# Patient Record
Sex: FEMALE
[De-identification: names, ages, dates, MRNs, and addresses within clinical notes are randomized per-mention and may not be internally consistent; named-entity substitution may affect disease eponyms.]

---

## 2023-07-21 PROCEDURE — 93010 ELECTROCARDIOGRAM REPORT: CPT | Performed by: INTERNAL MEDICINE

## 2023-10-04 PROCEDURE — 93010 ELECTROCARDIOGRAM REPORT: CPT | Performed by: INTERNAL MEDICINE

## 2023-12-09 PROCEDURE — 93010 ELECTROCARDIOGRAM REPORT: CPT | Performed by: INTERNAL MEDICINE

## 2023-12-11 ENCOUNTER — EXTERNAL RECORD (OUTPATIENT)
Dept: HEALTH INFORMATION MANAGEMENT | Facility: OTHER | Age: 78
End: 2023-12-11

## 2023-12-11 PROCEDURE — 93306 TTE W/DOPPLER COMPLETE: CPT | Performed by: INTERNAL MEDICINE

## 2024-04-21 ENCOUNTER — HOSPITAL ENCOUNTER (OUTPATIENT)
Facility: HOSPITAL | Age: 79
Setting detail: OBSERVATION
Discharge: NURSING, REHAB OR LONG-TERM CARE | End: 2024-04-23
Attending: STUDENT IN AN ORGANIZED HEALTH CARE EDUCATION/TRAINING PROGRAM | Admitting: INTERNAL MEDICINE
Payer: MEDICARE

## 2024-04-21 ENCOUNTER — APPOINTMENT (OUTPATIENT)
Dept: GENERAL RADIOLOGY | Facility: HOSPITAL | Age: 79
End: 2024-04-21
Attending: STUDENT IN AN ORGANIZED HEALTH CARE EDUCATION/TRAINING PROGRAM
Payer: MEDICARE

## 2024-04-21 ENCOUNTER — APPOINTMENT (OUTPATIENT)
Dept: ULTRASOUND IMAGING | Facility: HOSPITAL | Age: 79
End: 2024-04-21
Attending: STUDENT IN AN ORGANIZED HEALTH CARE EDUCATION/TRAINING PROGRAM
Payer: MEDICARE

## 2024-04-21 DIAGNOSIS — R55 SYNCOPE AND COLLAPSE: ICD-10-CM

## 2024-04-21 DIAGNOSIS — N17.9 AKI (ACUTE KIDNEY INJURY) (HCC): Primary | ICD-10-CM

## 2024-04-21 DIAGNOSIS — R34 OLIGURIA: ICD-10-CM

## 2024-04-21 PROBLEM — N18.9 ANEMIA DUE TO CHRONIC KIDNEY DISEASE: Status: ACTIVE | Noted: 2024-04-21

## 2024-04-21 PROBLEM — N18.9 ACUTE KIDNEY INJURY SUPERIMPOSED ON CKD (HCC): Status: ACTIVE | Noted: 2024-04-21

## 2024-04-21 PROBLEM — D63.1 ANEMIA DUE TO CHRONIC KIDNEY DISEASE: Status: ACTIVE | Noted: 2024-04-21

## 2024-04-21 PROBLEM — R60.9 EDEMA: Status: ACTIVE | Noted: 2024-04-21

## 2024-04-21 LAB
ALBUMIN SERPL-MCNC: 3.1 G/DL (ref 3.4–5)
ALBUMIN/GLOB SERPL: 0.9 {RATIO} (ref 1–2)
ALP LIVER SERPL-CCNC: 113 U/L
ALT SERPL-CCNC: 12 U/L
ANION GAP SERPL CALC-SCNC: 5 MMOL/L (ref 0–18)
APTT PPP: 29 SECONDS (ref 23.3–35.6)
AST SERPL-CCNC: 6 U/L (ref 15–37)
BASOPHILS # BLD AUTO: 0.01 X10(3) UL (ref 0–0.2)
BASOPHILS NFR BLD AUTO: 0.3 %
BILIRUB SERPL-MCNC: 0.2 MG/DL (ref 0.1–2)
BUN BLD-MCNC: 84 MG/DL (ref 9–23)
CALCIUM BLD-MCNC: 7.9 MG/DL (ref 8.5–10.1)
CHLORIDE SERPL-SCNC: 112 MMOL/L (ref 98–112)
CO2 SERPL-SCNC: 24 MMOL/L (ref 21–32)
CREAT BLD-MCNC: 4.25 MG/DL
EGFRCR SERPLBLD CKD-EPI 2021: 10 ML/MIN/1.73M2 (ref 60–?)
EOSINOPHIL # BLD AUTO: 0.12 X10(3) UL (ref 0–0.7)
EOSINOPHIL NFR BLD AUTO: 3 %
ERYTHROCYTE [DISTWIDTH] IN BLOOD BY AUTOMATED COUNT: 17.6 %
GLOBULIN PLAS-MCNC: 3.3 G/DL (ref 2.8–4.4)
GLUCOSE BLD-MCNC: 188 MG/DL (ref 70–99)
HCT VFR BLD AUTO: 26.1 %
HGB BLD-MCNC: 7.7 G/DL
IMM GRANULOCYTES # BLD AUTO: 0.02 X10(3) UL (ref 0–1)
IMM GRANULOCYTES NFR BLD: 0.5 %
INR BLD: 1.12 (ref 0.8–1.2)
LYMPHOCYTES # BLD AUTO: 0.5 X10(3) UL (ref 1–4)
LYMPHOCYTES NFR BLD AUTO: 12.5 %
MCH RBC QN AUTO: 24.1 PG (ref 26–34)
MCHC RBC AUTO-ENTMCNC: 29.5 G/DL (ref 31–37)
MCV RBC AUTO: 81.6 FL
MONOCYTES # BLD AUTO: 0.61 X10(3) UL (ref 0.1–1)
MONOCYTES NFR BLD AUTO: 15.3 %
NEUTROPHILS # BLD AUTO: 2.74 X10 (3) UL (ref 1.5–7.7)
NEUTROPHILS # BLD AUTO: 2.74 X10(3) UL (ref 1.5–7.7)
NEUTROPHILS NFR BLD AUTO: 68.4 %
OSMOLALITY SERPL CALC.SUM OF ELEC: 322 MOSM/KG (ref 275–295)
PLATELET # BLD AUTO: 96 10(3)UL (ref 150–450)
POTASSIUM SERPL-SCNC: 5.1 MMOL/L (ref 3.5–5.1)
PROT SERPL-MCNC: 6.4 G/DL (ref 6.4–8.2)
PROTHROMBIN TIME: 14.5 SECONDS (ref 11.6–14.8)
RBC # BLD AUTO: 3.2 X10(6)UL
SODIUM SERPL-SCNC: 141 MMOL/L (ref 136–145)
TROPONIN I SERPL HS-MCNC: 8 NG/L
WBC # BLD AUTO: 4 X10(3) UL (ref 4–11)

## 2024-04-21 PROCEDURE — 93970 EXTREMITY STUDY: CPT | Performed by: STUDENT IN AN ORGANIZED HEALTH CARE EDUCATION/TRAINING PROGRAM

## 2024-04-21 PROCEDURE — 71045 X-RAY EXAM CHEST 1 VIEW: CPT | Performed by: STUDENT IN AN ORGANIZED HEALTH CARE EDUCATION/TRAINING PROGRAM

## 2024-04-21 PROCEDURE — 99223 1ST HOSP IP/OBS HIGH 75: CPT | Performed by: INTERNAL MEDICINE

## 2024-04-21 RX ORDER — ACETAMINOPHEN 500 MG
1000 TABLET ORAL ONCE
Status: COMPLETED | OUTPATIENT
Start: 2024-04-21 | End: 2024-04-21

## 2024-04-21 RX ORDER — TRAZODONE HYDROCHLORIDE 100 MG/1
200 TABLET ORAL NIGHTLY
COMMUNITY

## 2024-04-21 RX ORDER — CALCITRIOL 0.25 UG/1
0.25 CAPSULE, LIQUID FILLED ORAL DAILY
COMMUNITY

## 2024-04-21 RX ORDER — ONDANSETRON 2 MG/ML
4 INJECTION INTRAMUSCULAR; INTRAVENOUS EVERY 6 HOURS PRN
Status: DISCONTINUED | OUTPATIENT
Start: 2024-04-21 | End: 2024-04-23

## 2024-04-21 RX ORDER — DULOXETIN HYDROCHLORIDE 60 MG/1
60 CAPSULE, DELAYED RELEASE ORAL DAILY
COMMUNITY

## 2024-04-21 RX ORDER — ALLOPURINOL 100 MG/1
100 TABLET ORAL DAILY
COMMUNITY

## 2024-04-21 RX ORDER — PANTOPRAZOLE SODIUM 40 MG/1
40 TABLET, DELAYED RELEASE ORAL DAILY
COMMUNITY

## 2024-04-21 RX ORDER — HEPARIN SODIUM 5000 [USP'U]/ML
5000 INJECTION, SOLUTION INTRAVENOUS; SUBCUTANEOUS EVERY 8 HOURS SCHEDULED
Status: DISCONTINUED | OUTPATIENT
Start: 2024-04-21 | End: 2024-04-23

## 2024-04-21 RX ORDER — FLUTICASONE PROPIONATE 50 MCG
2 SPRAY, SUSPENSION (ML) NASAL DAILY
COMMUNITY

## 2024-04-21 RX ORDER — CLONIDINE HYDROCHLORIDE 0.2 MG/1
0.2 TABLET ORAL 2 TIMES DAILY
COMMUNITY

## 2024-04-21 RX ORDER — ACETAMINOPHEN 500 MG
500 TABLET ORAL EVERY 4 HOURS PRN
Status: DISCONTINUED | OUTPATIENT
Start: 2024-04-21 | End: 2024-04-23

## 2024-04-21 RX ORDER — GABAPENTIN 100 MG/1
200 CAPSULE ORAL 3 TIMES DAILY
COMMUNITY

## 2024-04-21 RX ORDER — FUROSEMIDE 40 MG/1
40 TABLET ORAL DAILY
COMMUNITY

## 2024-04-21 RX ORDER — LIDOCAINE 50 MG/G
1 PATCH TOPICAL EVERY 12 HOURS
COMMUNITY

## 2024-04-21 RX ORDER — HYDRALAZINE HYDROCHLORIDE 50 MG/1
50 TABLET, FILM COATED ORAL
COMMUNITY

## 2024-04-21 RX ORDER — IPRATROPIUM BROMIDE AND ALBUTEROL SULFATE 2.5; .5 MG/3ML; MG/3ML
3 SOLUTION RESPIRATORY (INHALATION)
COMMUNITY

## 2024-04-21 RX ORDER — MEMANTINE HYDROCHLORIDE 10 MG/1
10 TABLET ORAL DAILY
COMMUNITY

## 2024-04-21 RX ORDER — MAGNESIUM OXIDE 400 MG (241.3 MG MAGNESIUM) TABLET
1 TABLET 2 TIMES DAILY
COMMUNITY

## 2024-04-21 RX ORDER — ATORVASTATIN CALCIUM 20 MG/1
20 TABLET, FILM COATED ORAL DAILY
COMMUNITY

## 2024-04-21 RX ORDER — METOCLOPRAMIDE HYDROCHLORIDE 5 MG/ML
5 INJECTION INTRAMUSCULAR; INTRAVENOUS EVERY 8 HOURS PRN
Status: DISCONTINUED | OUTPATIENT
Start: 2024-04-21 | End: 2024-04-23

## 2024-04-21 RX ORDER — CARVEDILOL 12.5 MG/1
12.5 TABLET ORAL 2 TIMES DAILY WITH MEALS
COMMUNITY

## 2024-04-21 RX ORDER — LEVOTHYROXINE SODIUM 0.1 MG/1
100 TABLET ORAL
COMMUNITY

## 2024-04-21 RX ORDER — CARBAMAZEPINE 200 MG/1
200 TABLET ORAL 3 TIMES DAILY
COMMUNITY

## 2024-04-21 NOTE — ED QUICK NOTES
PT family member arrives at bedside and stated that the PT had taken a THC gummy prior to going out to dinner.  The patient confirms that she did take the gummy about an hour ago. MD demarco

## 2024-04-21 NOTE — H&P
[facility]     Daisy Gorman Patient Status:  Emergency    1945 MRN [id number]   Location [facility] EMERGENCY DEPARTMENT Attending Nelly Jeronimo MD   Hosp Day # 0 PCP None Pcp     Chief Complaint: syncope    Subjective:    History of Present Illness:     Daisy Gorman is a 79 year old female with episode of syncope while at dinner. Pt was celebrating her birthday today with family when she had episode. Family thought she had just fallen asleep but when they tried to arouse her she was unresponsive. They laid her down and called EMS. She is awake and alert now in ED. She denies any focal weakness, numbness or tingling. No CP or SOB. She has noticed some worsening leg swelling. She denies any N/V/D/C or abd pain. She states she has been eatign well. Pt has noticed that she has been urinating less and less.     History/Other:    Past Medical History:  No past medical history on file.  Past Surgical History:   No past surgical history on file.   Family History:   No family history on file.  Social History:         Allergies:   Allergies   Allergen Reactions    Amoxicillin-Pot Clavulanate OTHER (SEE COMMENTS)     .    Clavulanic Acid OTHER (SEE COMMENTS)    Diphtheria Toxoid OTHER (SEE COMMENTS)    Influenza Vaccines OTHER (SEE COMMENTS)     .    Pertussis Vaccines OTHER (SEE COMMENTS)    Tetanus Toxoid OTHER (SEE COMMENTS)     .    Penicillins RASH       Medications:    No current facility-administered medications on file prior to encounter.     No current outpatient medications on file prior to encounter.       Review of Systems:   A comprehensive review of systems was completed.    Pertinent positives and negatives noted in the HPI.    Objective:   Physical Exam:    /79   Pulse 59   Temp 97 °F (36.1 °C) (Temporal)   Resp 15   Wt 160 lb (72.6 kg)   SpO2 98%   General: No acute distress, Alert  Respiratory: decreasd BS  Cardiovascular: S1, S2. Regular rate and  rhythm  Abdomen: Soft, Non-tender, non-distended, positive bowel sounds  Neuro: No new focal deficits  Extremities: + edema    Results:    Labs:      Labs Last 24 Hours:    Recent Labs   Lab 04/21/24  1613   RBC 3.20*   HGB 7.7*   HCT 26.1*   MCV 81.6   MCH 24.1*   MCHC 29.5*   RDW 17.6   NEPRELIM 2.74   WBC 4.0   PLT 96.0*       Recent Labs   Lab 04/21/24  1613   *   BUN 84*   CREATSERUM 4.25*   EGFRCR 10*   CA 7.9*   ALB 3.1*      K 5.1      CO2 24.0   ALKPHO 113   AST 6*   ALT 12*   BILT 0.2   TP 6.4       Lab Results   Component Value Date    INR 1.12 04/21/2024       Recent Labs   Lab 04/21/24  1613   TROPHS 8       No results for input(s): \"TROP\", \"PBNP\" in the last 168 hours.    No results for input(s): \"PCT\" in the last 168 hours.    Imaging: Imaging data reviewed in Epic.    Assessment & Plan:      #Syncope  Monitor on tele  Check ECHO  Check orthostatics  Cardiology to eval  Check CT head    #Acute on CKD  Renal to eval  Check renal US  Monitor urine output    #Lower Ext Edema  #Pulm Edema  Check LE doppler  Cardiology to eval    #Anemia  No active bleeding  Presumed chronic from renal disease  Check iron studies          Plan of care discussed with patient, ED Physician    Ford Linton MD    Supplementary Documentation:     The 21st Century Cures Act makes medical notes like these available to patients in the interest of transparency. Please be advised this is a medical document. Medical documents are intended to carry relevant information, facts as evident, and the clinical opinion of the practitioner. The medical note is intended as peer to peer communication and may appear blunt or direct. It is written in medical language and may contain abbreviations or verbiage that are unfamiliar.

## 2024-04-21 NOTE — ED PROVIDER NOTES
Patient Seen in: [facility] Emergency Department      History     Chief Complaint   Patient presents with    Syncope     Syncopal episode while out to dinner. No fall or trauma. No seizure activity. PT seemed \"asleep\" and was snoring. Lasted about 2-3 mins     Stated Complaint: Syncopal episode while out to dinner.  No fall or trauma. No seizure activity. *    Subjective:   HPI    The patient is a 79-year-old female brought in by EMS after syncopal episode.  Patient was at dinner celebrating her birthday with family members when her niece who is present states she heard the patient snoring.  She looked at her and the patient had her eyes closed.  Initially she thought the patient had just fallen asleep which was still unusual.  She tried to get the patient's attention called her name but the patient was still unresponsive therefore they laid her down across 3 chairs.  By the time EMS arrived patient was becoming more alert.  Currently the patient has no complaints.  She does not know what happened and she is asking why she here in the hospital.  She tells me she has chronic pain in her right shoulder but has no other that I do see that she appears somewhat fatigued as her eyes are drooping as she is talking.    Objective:   No pertinent past medical history.            No pertinent past surgical history.              No pertinent social history.            Review of Systems    Positive for stated complaint: Syncopal episode while out to dinner.  No fall or trauma. No seizure activity. *  Other systems are as noted in HPI.  Constitutional and vital signs reviewed.      All other systems reviewed and negative except as noted above.    Physical Exam     ED Triage Vitals [04/21/24 1606]   /51   Pulse 58   Resp 18   Temp 97 °F (36.1 °C)   Temp src Temporal   SpO2 97 %   O2 Device None (Room air)       Current:/79 (BP Location: Right arm)   Pulse 69   Temp 97.4 °F (36.3 °C) (Oral)   Resp 16   Ht  154.9 cm (5' 1\")   Wt 72.6 kg   SpO2 98%   BMI 30.23 kg/m²         Physical Exam  Vitals and nursing note reviewed.   Constitutional:       General: She is not in acute distress.     Appearance: Normal appearance.   HENT:      Head: Normocephalic.      Nose: Nose normal.      Mouth/Throat:      Mouth: Mucous membranes are moist.   Eyes:      Extraocular Movements: Extraocular movements intact.      Pupils: Pupils are equal, round, and reactive to light.   Cardiovascular:      Rate and Rhythm: Normal rate and regular rhythm.      Pulses: Normal pulses.   Pulmonary:      Effort: Pulmonary effort is normal.   Abdominal:      General: Abdomen is flat. Bowel sounds are normal. There is no distension.      Palpations: Abdomen is soft.      Tenderness: There is no abdominal tenderness. There is no right CVA tenderness, left CVA tenderness, guarding or rebound.      Hernia: No hernia is present.   Musculoskeletal:         General: No swelling or tenderness. Normal range of motion.      Cervical back: Normal range of motion.   Skin:     General: Skin is warm and dry.   Neurological:      Mental Status: She is alert and oriented to person, place, and time. Mental status is at baseline.   Psychiatric:         Mood and Affect: Mood normal.               ED Course     Labs Reviewed   COMP METABOLIC PANEL (14) - Abnormal; Notable for the following components:       Result Value    Glucose 188 (*)     BUN 84 (*)     Creatinine 4.25 (*)     Calcium, Total 7.9 (*)     Calculated Osmolality 322 (*)     eGFR-Cr 10 (*)     AST 6 (*)     ALT 12 (*)     Albumin 3.1 (*)     A/G Ratio 0.9 (*)     All other components within normal limits   CBC W/ DIFFERENTIAL - Abnormal; Notable for the following components:    RBC 3.20 (*)     HGB 7.7 (*)     HCT 26.1 (*)     PLT 96.0 (*)     MCH 24.1 (*)     MCHC 29.5 (*)     Lymphocyte Absolute 0.50 (*)     All other components within normal limits   TROPONIN I HIGH SENSITIVITY - Normal   PROTHROMBIN  TIME (PT) - Normal   PTT, ACTIVATED - Normal   CBC WITH DIFFERENTIAL WITH PLATELET    Narrative:     The following orders were created for panel order CBC With Differential With Platelet.  Procedure                               Abnormality         Status                     ---------                               -----------         ------                     CBC W/ DIFFERENTIAL[[id number]]          Abnormal            Final result                 Please view results for these tests on the individual orders.   SCAN SLIDE   URINALYSIS WITH CULTURE REFLEX   DRUG ABUSE PANEL 10 SCREEN   BASIC METABOLIC PANEL (8)   CBC WITH DIFFERENTIAL WITH PLATELET   MAGNESIUM   IRON AND TIBC   RAINBOW DRAW BLUE   RAINBOW DRAW GOLD     EKG    Rate, intervals and axes as noted on EKG Report.  Rate: 57  Rhythm: Sinus Rhythm  Reading: No ST elevations depressions noted but the patient has a first-degree AV block and sinus bradycardia         US VENOUS DOPPLER LEG BILAT - DIAG IMG (CPT=93970)    Result Date: 4/21/2024  CONCLUSION:  No evidence of deep vein thrombus in the bilateral lower extremities.   LOCATION:  [facility]   Dictated by (CST): Omega Gonzales MD on 4/21/2024 at 8:08 PM     Finalized by (CST): Omega Gonzales MD on 4/21/2024 at 8:09 PM       XR CHEST AP PORTABLE  (CPT=71045)    Result Date: 4/21/2024  CONCLUSION:  Diffuse interstitial prominence and mild cardiomegaly.  Findings may be related to edema though superimposed infectious/inflammatory process is not excluded.   LOCATION:  [facility]      Dictated by (CST): Omega Gonzales MD on 4/21/2024 at 5:47 PM     Finalized by (CST): Omega Gonzales MD on 4/21/2024 at 5:47 PM                       MDM      The differential includes the following  Cardiac arrhythmia, acute MI, acute intracranial hemorrhage spontaneously which would all be life threats, possible PE, sepsis    Pertinent comorbidities include    Pertinent social history includes      Labs  Hemoglobin of 7.7, MCV of  81.6  Creatinine of 4.25 today, creatinine of 3.78 in January 2024 creatinine appears closer to 3.4 baseline      Imaging studies  I reviewed the images and my independent interpreation after review no signs of pneumonia. Additionally, I reviewd the radiology report that states the following diffuse interstitial prominence and cardiomegaly    External data reviewed  CBCs from last year showing hemoglobin baseline closer to 8.5-8.7, in March 2023 patient's hemoglobin was 7.5  In care everywhere there is a note from [facility] October 2022 for EGD and colonoscopy findings as follows Chronic gastritis gastric atrophy H. pylori negative.  Multiple tubular adenomas and tubular villous adenoma repeat colonoscopy 5 years         Discussion of management with external providers    ER course  Patient is afebrile and otherwise hemodynamically stable here.  Besides her having difficulty raising her right arm and having some bilateral mild edema she has no other focal findings on exam.  She is alert and oriented to person and place.  There is a wide variation of what could have caused her syncopal episode therefore a broad workup will be performed.    4:27 PM  I was informed by nursing staff that patient apparently took an edible    5:12 PM  Bill a BUN of 80, creatinine of 4.25 which is above patient's baseline has I reviewed care everywhere and it looks like her creatinine is usually between 3-3.4 patient's hemoglobin is also 7.7 and her baseline is closer to s 8.7.    6:34 PM  Patient is still not produced any urine.  She complains of bilateral lower extremity swelling and pain therefore ultrasound was performed which showed no evidence of DVT.  Given her lab derangements her syncopal episode and fell likely secondary to her THC gummy use I discussed admission for observation with the patient and her family members who ultimately agreed to this.  [facility]                             Medical  Decision Making      Disposition and Plan     Clinical Impression:  1. BARBER (acute kidney injury) ([facility])    2. Syncope and collapse    3. Oliguria         Disposition:  Admit  4/21/2024  6:28 pm    Follow-up:  No follow-up provider specified.        Medications Prescribed:  Current Discharge Medication List                            Hospital Problems       Present on Admission             ICD-10-CM Noted POA    * (Principal) BARBER (acute kidney injury) ([facility]) N17.9 4/21/2024 Unknown    Acute kidney injury superimposed on CKD ([facility]) N17.9, N18.9 4/21/2024 Unknown    Anemia due to chronic kidney disease N18.9, D63.1 4/21/2024 Unknown    Edema R60.9 4/21/2024 Unknown    Oliguria R34 4/21/2024 Unknown    Syncope R55 4/21/2024 Unknown    Syncope and collapse R55 4/21/2024 Unknown

## 2024-04-21 NOTE — ED INITIAL ASSESSMENT (HPI)
Syncopal episode while out to dinner. No fall or trauma. No seizure activity. PT seemed \"asleep\" and was snoring. Lasted about 2-3 mins

## 2024-04-22 ENCOUNTER — APPOINTMENT (OUTPATIENT)
Dept: CV DIAGNOSTICS | Facility: HOSPITAL | Age: 79
End: 2024-04-22
Attending: INTERNAL MEDICINE
Payer: MEDICARE

## 2024-04-22 ENCOUNTER — APPOINTMENT (OUTPATIENT)
Dept: ULTRASOUND IMAGING | Facility: HOSPITAL | Age: 79
End: 2024-04-22
Attending: INTERNAL MEDICINE
Payer: MEDICARE

## 2024-04-22 ENCOUNTER — NURSE ONLY (OUTPATIENT)
Dept: ELECTROPHYSIOLOGY | Facility: HOSPITAL | Age: 79
End: 2024-04-22
Attending: INTERNAL MEDICINE
Payer: MEDICARE

## 2024-04-22 PROBLEM — R56.9 SEIZURE (HCC): Status: ACTIVE | Noted: 2024-04-22

## 2024-04-22 LAB
ANION GAP SERPL CALC-SCNC: 7 MMOL/L (ref 0–18)
ATRIAL RATE: 57 BPM
BASOPHILS # BLD AUTO: 0.02 X10(3) UL (ref 0–0.2)
BASOPHILS NFR BLD AUTO: 0.6 %
BUN BLD-MCNC: 80 MG/DL (ref 9–23)
CALCIUM BLD-MCNC: 8.5 MG/DL (ref 8.5–10.1)
CHLORIDE SERPL-SCNC: 111 MMOL/L (ref 98–112)
CO2 SERPL-SCNC: 24 MMOL/L (ref 21–32)
CREAT BLD-MCNC: 3.48 MG/DL
EGFRCR SERPLBLD CKD-EPI 2021: 13 ML/MIN/1.73M2 (ref 60–?)
EOSINOPHIL # BLD AUTO: 0.12 X10(3) UL (ref 0–0.7)
EOSINOPHIL NFR BLD AUTO: 3.8 %
ERYTHROCYTE [DISTWIDTH] IN BLOOD BY AUTOMATED COUNT: 17.8 %
FOLATE SERPL-MCNC: 5.2 NG/ML (ref 8.7–?)
GLUCOSE BLD-MCNC: 91 MG/DL (ref 70–99)
HCT VFR BLD AUTO: 24.8 %
HGB BLD-MCNC: 7.4 G/DL
IMM GRANULOCYTES # BLD AUTO: 0.01 X10(3) UL (ref 0–1)
IMM GRANULOCYTES NFR BLD: 0.3 %
IRON SATN MFR SERPL: 31 %
IRON SERPL-MCNC: 56 UG/DL
LYMPHOCYTES # BLD AUTO: 0.49 X10(3) UL (ref 1–4)
LYMPHOCYTES NFR BLD AUTO: 15.6 %
MAGNESIUM SERPL-MCNC: 2.1 MG/DL (ref 1.6–2.6)
MCH RBC QN AUTO: 24.1 PG (ref 26–34)
MCHC RBC AUTO-ENTMCNC: 29.8 G/DL (ref 31–37)
MCV RBC AUTO: 80.8 FL
MONOCYTES # BLD AUTO: 0.45 X10(3) UL (ref 0.1–1)
MONOCYTES NFR BLD AUTO: 14.3 %
NEUTROPHILS # BLD AUTO: 2.05 X10 (3) UL (ref 1.5–7.7)
NEUTROPHILS # BLD AUTO: 2.05 X10(3) UL (ref 1.5–7.7)
NEUTROPHILS NFR BLD AUTO: 65.4 %
OSMOLALITY SERPL CALC.SUM OF ELEC: 318 MOSM/KG (ref 275–295)
P AXIS: 74 DEGREES
P-R INTERVAL: 276 MS
PLATELET # BLD AUTO: 115 10(3)UL (ref 150–450)
POTASSIUM SERPL-SCNC: 4.4 MMOL/L (ref 3.5–5.1)
Q-T INTERVAL: 462 MS
QRS DURATION: 82 MS
QTC CALCULATION (BEZET): 449 MS
R AXIS: 71 DEGREES
RBC # BLD AUTO: 3.07 X10(6)UL
SODIUM SERPL-SCNC: 142 MMOL/L (ref 136–145)
T AXIS: 35 DEGREES
TIBC SERPL-MCNC: 180 UG/DL (ref 240–450)
TRANSFERRIN SERPL-MCNC: 121 MG/DL (ref 200–360)
VENTRICULAR RATE: 57 BPM
WBC # BLD AUTO: 3.1 X10(3) UL (ref 4–11)

## 2024-04-22 PROCEDURE — 93306 TTE W/DOPPLER COMPLETE: CPT | Performed by: INTERNAL MEDICINE

## 2024-04-22 PROCEDURE — 99223 1ST HOSP IP/OBS HIGH 75: CPT | Performed by: INTERNAL MEDICINE

## 2024-04-22 PROCEDURE — 95816 EEG AWAKE AND DROWSY: CPT | Performed by: OTHER

## 2024-04-22 PROCEDURE — 76775 US EXAM ABDO BACK WALL LIM: CPT | Performed by: INTERNAL MEDICINE

## 2024-04-22 PROCEDURE — 99232 SBSQ HOSP IP/OBS MODERATE 35: CPT | Performed by: INTERNAL MEDICINE

## 2024-04-22 RX ORDER — HYDRALAZINE HYDROCHLORIDE 50 MG/1
50 TABLET, FILM COATED ORAL
Status: DISCONTINUED | OUTPATIENT
Start: 2024-04-22 | End: 2024-04-23

## 2024-04-22 RX ORDER — FLUTICASONE PROPIONATE 50 MCG
2 SPRAY, SUSPENSION (ML) NASAL DAILY
Status: DISCONTINUED | OUTPATIENT
Start: 2024-04-22 | End: 2024-04-23

## 2024-04-22 RX ORDER — LEVOTHYROXINE SODIUM 0.1 MG/1
100 TABLET ORAL
Status: DISCONTINUED | OUTPATIENT
Start: 2024-04-22 | End: 2024-04-23

## 2024-04-22 RX ORDER — CARBAMAZEPINE 200 MG/1
200 TABLET ORAL 3 TIMES DAILY
Status: DISCONTINUED | OUTPATIENT
Start: 2024-04-22 | End: 2024-04-23

## 2024-04-22 RX ORDER — CARVEDILOL 12.5 MG/1
12.5 TABLET ORAL 2 TIMES DAILY WITH MEALS
Status: DISCONTINUED | OUTPATIENT
Start: 2024-04-22 | End: 2024-04-23

## 2024-04-22 RX ORDER — GABAPENTIN 100 MG/1
100 CAPSULE ORAL 3 TIMES DAILY
Status: DISCONTINUED | OUTPATIENT
Start: 2024-04-22 | End: 2024-04-23

## 2024-04-22 RX ORDER — DULOXETIN HYDROCHLORIDE 30 MG/1
60 CAPSULE, DELAYED RELEASE ORAL DAILY
Status: DISCONTINUED | OUTPATIENT
Start: 2024-04-22 | End: 2024-04-23

## 2024-04-22 RX ORDER — ALBUTEROL SULFATE 90 UG/1
2 AEROSOL, METERED RESPIRATORY (INHALATION) EVERY 4 HOURS PRN
Status: DISCONTINUED | OUTPATIENT
Start: 2024-04-22 | End: 2024-04-23

## 2024-04-22 RX ORDER — FUROSEMIDE 40 MG/1
40 TABLET ORAL DAILY
Status: DISCONTINUED | OUTPATIENT
Start: 2024-04-22 | End: 2024-04-23

## 2024-04-22 RX ORDER — ATORVASTATIN CALCIUM 20 MG/1
20 TABLET, FILM COATED ORAL DAILY
Status: DISCONTINUED | OUTPATIENT
Start: 2024-04-22 | End: 2024-04-23

## 2024-04-22 RX ORDER — CLONIDINE HYDROCHLORIDE 0.1 MG/1
0.2 TABLET ORAL 2 TIMES DAILY
Status: DISCONTINUED | OUTPATIENT
Start: 2024-04-22 | End: 2024-04-23

## 2024-04-22 RX ORDER — TRAZODONE HYDROCHLORIDE 100 MG/1
200 TABLET ORAL NIGHTLY
Status: DISCONTINUED | OUTPATIENT
Start: 2024-04-22 | End: 2024-04-23

## 2024-04-22 RX ORDER — NIFEDIPINE 30 MG/1
60 TABLET, EXTENDED RELEASE ORAL EVERY 12 HOURS
Status: DISCONTINUED | OUTPATIENT
Start: 2024-04-22 | End: 2024-04-23

## 2024-04-22 RX ORDER — ALLOPURINOL 100 MG/1
100 TABLET ORAL DAILY
Status: DISCONTINUED | OUTPATIENT
Start: 2024-04-22 | End: 2024-04-23

## 2024-04-22 RX ORDER — GABAPENTIN 100 MG/1
200 CAPSULE ORAL 3 TIMES DAILY
Status: DISCONTINUED | OUTPATIENT
Start: 2024-04-22 | End: 2024-04-22 | Stop reason: SDUPTHER

## 2024-04-22 RX ORDER — HYDRALAZINE HYDROCHLORIDE 20 MG/ML
10 INJECTION INTRAMUSCULAR; INTRAVENOUS EVERY 6 HOURS PRN
Status: DISCONTINUED | OUTPATIENT
Start: 2024-04-22 | End: 2024-04-23

## 2024-04-22 RX ORDER — PANTOPRAZOLE SODIUM 40 MG/1
40 TABLET, DELAYED RELEASE ORAL DAILY
Status: DISCONTINUED | OUTPATIENT
Start: 2024-04-22 | End: 2024-04-23

## 2024-04-22 RX ORDER — GABAPENTIN 100 MG/1
200 CAPSULE ORAL 3 TIMES DAILY
Status: DISCONTINUED | OUTPATIENT
Start: 2024-04-22 | End: 2024-04-22

## 2024-04-22 RX ORDER — MEMANTINE HYDROCHLORIDE 10 MG/1
10 TABLET ORAL DAILY
Status: DISCONTINUED | OUTPATIENT
Start: 2024-04-22 | End: 2024-04-23

## 2024-04-22 NOTE — CONSULTS
[facility]  Report of Consultation    Daisy Gorman Patient Status:  Inpatient    1945 MRN [id number]   Location [facility] 4NW-A Attending Damion Nye,    Hosp Day # 1 PCP None Pcp     Reason for Consultation:  CKD/BARBER    History of Present Illness:  Daisy Gorman is a a(n) 79 year old female with hx of CKD- stg IV- follows w/ Dr. Jones (baseline Cr ~ 3-4, seizure d/o, HTN admitted to hospital for evaluation of syncope/episode of unresponsiveness. Patient denies any acute problems currently  Denies headaches/vision changes. No n/v  No palpiations    She feels she may have had a seizure; cannot recall the last time she had a seizure before- she is on tegretol        History:  No past medical history on file.  No past surgical history on file.  No family history on file.       Allergies:  Allergies   Allergen Reactions    Amoxicillin-Pot Clavulanate OTHER (SEE COMMENTS)     .    Clavulanic Acid OTHER (SEE COMMENTS)    Diphtheria Toxoid OTHER (SEE COMMENTS)    Influenza Vaccines OTHER (SEE COMMENTS)     .    Pertussis Vaccines OTHER (SEE COMMENTS)    Tetanus Toxoid OTHER (SEE COMMENTS)     .    Penicillins RASH       Current Medications:    Current Facility-Administered Medications:     albuterol (Ventolin HFA) 108 (90 Base) MCG/ACT inhaler 2 puff, 2 puff, Inhalation, Q4H PRN    allopurinol (Zyloprim) tab 100 mg, 100 mg, Oral, Daily    atorvastatin (Lipitor) tab 20 mg, 20 mg, Oral, Daily    carBAMazepine (TEGretol) tab 200 mg, 200 mg, Oral, TID    carvedilol (Coreg) tab 12.5 mg, 12.5 mg, Oral, BID with meals    cloNIDine (Catapres) tab 0.2 mg, 0.2 mg, Oral, BID    DULoxetine (Cymbalta) DR cap 60 mg, 60 mg, Oral, Daily    fluticasone propionate (Flonase) 50 MCG/ACT nasal suspension 2 spray, 2 spray, Nasal, Daily    hydrALAZINE (Apresoline) tab 50 mg, 50 mg, Oral, TID CC    levothyroxine (Synthroid) tab 100 mcg, 100 mcg, Oral, Before breakfast    memantine (Namenda) tab 10 mg, 10 mg, Oral, Daily     NIFEdipine ER (Procardia-XL) 24 hr tab 60 mg, 60 mg, Oral, q12h    pantoprazole (Protonix) DR tab 40 mg, 40 mg, Oral, Daily    traZODone (Desyrel) tab 200 mg, 200 mg, Oral, Nightly    furosemide (Lasix) tab 40 mg, 40 mg, Oral, Daily    gabapentin (Neurontin) cap 200 mg, 200 mg, Oral, TID    heparin (Porcine) 5000 UNIT/ML injection 5,000 Units, 5,000 Units, Subcutaneous, Q8H MISTI    acetaminophen (Tylenol Extra Strength) tab 500 mg, 500 mg, Oral, Q4H PRN    ondansetron (Zofran) 4 MG/2ML injection 4 mg, 4 mg, Intravenous, Q6H PRN    metoclopramide (Reglan) 5 mg/mL injection 5 mg, 5 mg, Intravenous, Q8H PRN  Home Medications:  No current outpatient medications on file.       Review of Systems:  See HPI; A total of 12 systems reviewed and otherwise unremarkable.    Physical Exam:  Vital signs: Blood pressure (!) 168/73, pulse 67, temperature 98.1 °F (36.7 °C), temperature source Oral, resp. rate 18, height 5' 1\" (1.549 m), weight 160 lb (72.6 kg), SpO2 97%.  General: No acute distress. Alert and oriented x 3.  HEENT: Moist mucous membranes. EOM-I. PERRL  Neck: No lymphadenopathy.  No JVD. No carotid bruits.  Respiratory: Clear to auscultation bilaterally.  No wheezes. No rhonchi.  Cardiovascular: S1, S2.  Regular rate and rhythm.  No murmurs. Equal pulses   Abdomen: Soft, nontender, nondistended.  Positive bowel sounds. No rebound tenderness   Neurologic: No focal neurological deficits.   Musculoskeletal: Full range of motion of all extremities.  No swelling noted.   Integument: No lesions. No erythema.  Psychiatric: Appropriate mood and affect.    Laboratory:  Lab Results   Component Value Date    WBC 3.1 04/22/2024    HGB 7.4 04/22/2024    HCT 24.8 04/22/2024    .0 04/22/2024    CREATSERUM 3.48 04/22/2024    BUN 80 04/22/2024     04/22/2024    K 4.4 04/22/2024     04/22/2024    CO2 24.0 04/22/2024    GLU 91 04/22/2024    CA 8.5 04/22/2024    ALB 3.1 04/21/2024    ALKPHO 113 04/21/2024    BILT 0.2  04/21/2024    TP 6.4 04/21/2024    AST 6 04/21/2024    ALT 12 04/21/2024    PTT 29.0 04/21/2024    INR 1.12 04/21/2024    PTP 14.5 04/21/2024    MG 2.1 04/22/2024          BUN (mg/dL)   Date Value   04/22/2024 80 (H)   04/21/2024 84 (H)     Creatinine (mg/dL)   Date Value   04/22/2024 3.48 (H)   04/21/2024 4.25 (H)         Imaging:  Reviewed    Impression:  Syncope- unclear etiology; appears volume replete.   - monitor on tele  - monitor orthostatics  - appreciate cardiology  - consider neuro evaluation  BARBER/CKD- Cr near baseline; she follows closely w/ Dr. Jones  - check UA  - await renal US findings  - monitor labs  Anemia - suspect due to chronic disease  - montior: check iron studies  - will consider VINCENT  HTN- sub-optimal currently  - meds reviewed  Seizure d/o - on tegretol  Hyothyroidism     Thank you for allowing me to participate in this patient's care.  Please feel free to call me with any questions or concerns.    Jaden Avelar MD  4/22/2024  9:53 AM

## 2024-04-22 NOTE — PROCEDURES
EEG REPORT;    Reason for Examination: Syncope/unresponsiveness    Technical Summary:   18 Channels of EEG and 1 Channel of EKG was performed utilizing internation 10/20 method.        Background Activity:   The background activity consisted of 8 Hz waveforms, reactive to eye opening/ external stimulation.    Abnormality:  Throughout the recording low to medium voltage, polymorphic, 3 to 6 Hz slow activity was noted diffusely over both hemispheres      Activation:    Hyperventilation:   Not Performed.    Photic Stimulation:  Driving response seen.No       Sleep:  Stage I sleep seen.     Impression:  This is a Abnormal EEG.    Mild diffuse slowing into delta and theta range was noted.  This constellation of findings can be seen in encephalopathy due to metabolic/toxic etiology, medication effects or diffuse cerebral injury.  No focal, lateralized or generalized epileptiform activity seen. Clinical correlation is recommended.        Miguelangel Benton MD  [facility].

## 2024-04-22 NOTE — PLAN OF CARE
Problem: Patient/Family Goals  Goal: Patient/Family Long Term Goal  Description: Patient's Long Term Goal:     Interventions:  -   - See additional Care Plan goals for specific interventions  Outcome: Progressing  Goal: Patient/Family Short Term Goal  Description: Patient's Short Term Goal:     Interventions:   -   - See additional Care Plan goals for specific interventions  Outcome: Progressing

## 2024-04-22 NOTE — CONSULTS
Cardiology Consultation    Daisy Gorman Patient Status:  Inpatient    1945 MRN [id number]   Location [facility] 4NW-A Attending Damion Nye DO   Hosp Day # 1 PCP None Pcp     Reason for Consultation:  syncope      History of Present Illness:  Daisy Gorman is a a(n) 79 year old female. Very nice woman with CRI, difficult to control HTN, and seizure d/o, on Tegretol.  She was out to eat with family and had an episode of unresponsiveness, appeared to be snoring.  They lied her down and when EMS arrived, she was coming around.  No incontinence.  Since arrival, she has felt well.  Tele negative.  Renal fx a bit worse than baseline.    History:  No past medical history on file.  No past surgical history on file.  No family history on file.      Allergies:  Allergies   Allergen Reactions    Amoxicillin-Pot Clavulanate OTHER (SEE COMMENTS)     .    Clavulanic Acid OTHER (SEE COMMENTS)    Diphtheria Toxoid OTHER (SEE COMMENTS)    Influenza Vaccines OTHER (SEE COMMENTS)     .    Pertussis Vaccines OTHER (SEE COMMENTS)    Tetanus Toxoid OTHER (SEE COMMENTS)     .    Penicillins RASH       Medications:   heparin  5,000 Units Subcutaneous Q8H MISTI       Continuous Infusions:      Social History:       Review of Systems:  All systems were reviewed and are negative except as described above in HPI.    Physical Exam:      Temp:  [97 °F (36.1 °C)-98.1 °F (36.7 °C)] 98.1 °F (36.7 °C)  Pulse:  [57-70] 67  Resp:  [14-20] 20  BP: (100-168)/(51-79) 168/73  SpO2:  [97 %-98 %] 97 %    Last 3 Weights   24 2157 160 lb (72.6 kg)   24 1613 160 lb (72.6 kg)           General: No apparent distress  HEENT: No focal deficits.  Neck: supple. JVP normal  Cardiac: Regular rhythm, S1, S2 normal,   No rub or gallop.  No murmur.  Lungs: CTA  Abdomen: Soft, non-tender.   Extremities: No edema  Neurologic: no focal deficits  Skin: Warm and dry.          Telemetry: sinus    Laboratories and Data:  Diagnostics:    EKG,  4/22/2024:  SB, first degree AVB    CXR, 4/22/2024:  CM mild PVR    Labs:   HEM:  Recent Labs   Lab 04/21/24  1613 04/22/24  0640   WBC 4.0 3.1*   HGB 7.7* 7.4*   PLT 96.0* 115.0*       Chem:  Recent Labs   Lab 04/21/24  1613 04/22/24  0640    142   K 5.1 4.4    111   CO2 24.0 24.0   BUN 84* 80*   CREATSERUM 4.25* 3.48*   CA 7.9* 8.5   MG  --  2.1   * 91       Recent Labs   Lab 04/21/24  1613   ALT 12*   AST 6*   ALB 3.1*       Recent Labs   Lab 04/21/24  1613   PTP 14.5   INR 1.12       No results for input(s): \"TROP\", \"CK\" in the last 168 hours.      Impression:   Syncope - more prerenal on labs, so may have been orthostattic.  Also has a h/o seizures.  Difficult to control HTN.  Acute on CRI, baseline Cr 3-4.  Acute on chronic Anemia - Hb 7.4, baseline 8.5.  CXR suggestive of CHF - historically normal EF.  On RA.    Plan:  Will check echo.  Same BP meds as on admit.  Resume po furosemide.  Consider neuro eval to r/o seizure.    César Hou MD  4/22/2024  7:48 AM  C5

## 2024-04-22 NOTE — ED QUICK NOTES
Orders for admission, patient is aware of plan and ready to go upstairs. Any questions, please call ED RN Jocelynn at extension [phone].     Patient Covid vaccination status: Fully vaccinated     COVID Test Ordered in ED: None    COVID Suspicion at Admission: N/A    Running Infusions:  None    Mental Status/LOC at time of transport: A&O x3     Other pertinent information:   CIWA score: N/A   NIH score:  N/A

## 2024-04-22 NOTE — PLAN OF CARE
Pt A/O x4. VSS-BP elevated w/ SBP reaching 193 once during day, MD aware. PRN hydralazine ordered and admin w/ SBP improving to 155. Afebrile. Pt denied pain throughout day. Medications admin per MAR. EEG completed per order. Pt repositioned in bed. PT/OT. Renal US and echo completed per order. Fall and safety precautions in place. Call light within reach.

## 2024-04-22 NOTE — PROGRESS NOTES
[facility]   part of [facility]     Hospitalist Progress Note     Daisy Gorman Patient Status:  Inpatient    1945 MRN [id number]   Location [facility] 4NW-A Attending Damion Nye,    Hosp Day # 1 PCP None Pcp     Chief Complaint: Syncope    Subjective:     Patient has no somatic complaints, she thinks she may have had a seizure yesterday     Objective:    Review of Systems:   A comprehensive review of systems was completed; pertinent positive and negatives stated in subjective.    Vital signs:  Temp:  [97 °F (36.1 °C)-98.1 °F (36.7 °C)] 98.1 °F (36.7 °C)  Pulse:  [57-70] 67  Resp:  [14-20] 20  BP: (100-168)/(51-79) 168/73  SpO2:  [97 %-98 %] 97 %    Physical Exam:    General: No acute distress  Respiratory: No wheezes, no rhonchi  Cardiovascular: S1, S2, regular rate and rhythm  Abdomen: Soft, Non-tender, non-distended, positive bowel sounds  Neuro: No new focal deficits.   Extremities: No edema    Diagnostic Data:    Labs:  Recent Labs   Lab 24  1613 24  0640   WBC 4.0 3.1*   HGB 7.7* 7.4*   MCV 81.6 80.8   PLT 96.0* 115.0*   INR 1.12  --        Recent Labs   Lab 24  1613 24  0640   * 91   BUN 84* 80*   CREATSERUM 4.25* 3.48*   CA 7.9* 8.5   ALB 3.1*  --     142   K 5.1 4.4    111   CO2 24.0 24.0   ALKPHO 113  --    AST 6*  --    ALT 12*  --    BILT 0.2  --    TP 6.4  --        Estimated Creatinine Clearance: 9.7 mL/min (A) (based on SCr of 3.48 mg/dL (H)).    Recent Labs   Lab 24  1613   TROPHS 8       Recent Labs   Lab 24  1613   PTP 14.5   INR 1.12                  Microbiology    No results found for this visit on 24.      Imaging: Reviewed in Epic.    Medications:    heparin  5,000 Units Subcutaneous Q8H MISTI      allopurinol  100 mg Oral Daily    atorvastatin  20 mg Oral Daily    carBAMazepine  200 mg Oral TID    carvedilol  12.5 mg Oral BID with meals    cloNIDine  0.2 mg Oral BID    DULoxetine  60 mg Oral Daily     fluticasone propionate  2 spray Nasal Daily    gabapentin  200 mg Oral TID    hydrALAZINE  50 mg Oral TID CC    levothyroxine  100 mcg Oral Before breakfast    memantine  10 mg Oral Daily    NIFEdipine ER  60 mg Oral q12h    pantoprazole  40 mg Oral Daily    traZODone  200 mg Oral Nightly    furosemide  40 mg Oral Daily    heparin  5,000 Units Subcutaneous Q8H MISTI         Assessment & Plan:      #BARBER on CKD IV  -IVF    #Syncope vs. Seizure  -telemetry  -ECHO pending, Cardiology on consult   -does report a history of seizure disorder, check EEG    #Seizure disorder  -Tegretol TID  -reduce Gabapentin for renal fxn     #Pancytopenia   -monitor counts, possible MDS, should have outpatient     #HTN  -PTA antihypertensives     #Dementia  -Memantine       Damion Nye, DO    Supplementary Documentation:     Quality:  DVT Mechanical Prophylaxis:   SCDs,    DVT Pharmacologic Prophylaxis   Medication    heparin (Porcine) 5000 UNIT/ML injection 5,000 Units                Code Status: Not on file  Jesus: No urinary catheter in place  The 21st Century Cures Act makes medical notes like these available to patients in the interest of transparency. Please be advised this is a medical document. Medical documents are intended to carry relevant information, facts as evident, and the clinical opinion of the practitioner. The medical note is intended as peer to peer communication and may appear blunt or direct. It is written in medical language and may contain abbreviations or verbiage that are unfamiliar.             **Certification      PHYSICIAN Certification of Need for Inpatient Hospitalization - Initial Certification    Patient will require inpatient services that will reasonably be expected to span two midnight's based on the clinical documentation in H+P.   Based on patients current state of illness, I anticipate that, after discharge, patient will require TBD.

## 2024-04-23 VITALS
TEMPERATURE: 99 F | DIASTOLIC BLOOD PRESSURE: 73 MMHG | RESPIRATION RATE: 18 BRPM | HEIGHT: 61 IN | SYSTOLIC BLOOD PRESSURE: 162 MMHG | HEART RATE: 65 BPM | OXYGEN SATURATION: 96 % | BODY MASS INDEX: 30.21 KG/M2 | WEIGHT: 160 LBS

## 2024-04-23 PROBLEM — N18.4 CKD (CHRONIC KIDNEY DISEASE) STAGE 4, GFR 15-29 ML/MIN (HCC): Status: ACTIVE | Noted: 2024-04-23

## 2024-04-23 LAB
AMPHET UR QL SCN: NEGATIVE
ANION GAP SERPL CALC-SCNC: 5 MMOL/L (ref 0–18)
BARBITURATES UR QL SCN: NEGATIVE
BASOPHILS # BLD AUTO: 0.02 X10(3) UL (ref 0–0.2)
BASOPHILS NFR BLD AUTO: 0.7 %
BENZODIAZ UR QL SCN: NEGATIVE
BILIRUB UR QL STRIP.AUTO: NEGATIVE
BUN BLD-MCNC: 72 MG/DL (ref 9–23)
CALCIUM BLD-MCNC: 8.8 MG/DL (ref 8.5–10.1)
CHLORIDE SERPL-SCNC: 111 MMOL/L (ref 98–112)
CLARITY UR REFRACT.AUTO: CLEAR
CO2 SERPL-SCNC: 26 MMOL/L (ref 21–32)
COCAINE UR QL: NEGATIVE
COLOR UR AUTO: COLORLESS
CREAT BLD-MCNC: 3.08 MG/DL
CREAT UR-SCNC: 21.7 MG/DL
CREAT UR-SCNC: 25.8 MG/DL
EGFRCR SERPLBLD CKD-EPI 2021: 15 ML/MIN/1.73M2 (ref 60–?)
EOSINOPHIL # BLD AUTO: 0.13 X10(3) UL (ref 0–0.7)
EOSINOPHIL NFR BLD AUTO: 4.9 %
ERYTHROCYTE [DISTWIDTH] IN BLOOD BY AUTOMATED COUNT: 17.4 %
GLUCOSE BLD-MCNC: 96 MG/DL (ref 70–99)
GLUCOSE UR STRIP.AUTO-MCNC: NORMAL MG/DL
HCT VFR BLD AUTO: 27.3 %
HGB BLD-MCNC: 8 G/DL
IMM GRANULOCYTES # BLD AUTO: 0.01 X10(3) UL (ref 0–1)
IMM GRANULOCYTES NFR BLD: 0.4 %
KETONES UR STRIP.AUTO-MCNC: NEGATIVE MG/DL
LEUKOCYTE ESTERASE UR QL STRIP.AUTO: NEGATIVE
LYMPHOCYTES # BLD AUTO: 0.58 X10(3) UL (ref 1–4)
LYMPHOCYTES NFR BLD AUTO: 21.6 %
MAGNESIUM SERPL-MCNC: 1.9 MG/DL (ref 1.6–2.6)
MCH RBC QN AUTO: 23.5 PG (ref 26–34)
MCHC RBC AUTO-ENTMCNC: 29.3 G/DL (ref 31–37)
MCV RBC AUTO: 80.3 FL
MDMA UR QL SCN: NEGATIVE
METHADONE UR QL SCN: NEGATIVE
MONOCYTES # BLD AUTO: 0.5 X10(3) UL (ref 0.1–1)
MONOCYTES NFR BLD AUTO: 18.7 %
NEUTROPHILS # BLD AUTO: 1.44 X10 (3) UL (ref 1.5–7.7)
NEUTROPHILS # BLD AUTO: 1.44 X10(3) UL (ref 1.5–7.7)
NEUTROPHILS NFR BLD AUTO: 53.7 %
NITRITE UR QL STRIP.AUTO: NEGATIVE
OPIATES UR QL SCN: NEGATIVE
OSMOLALITY SERPL CALC.SUM OF ELEC: 315 MOSM/KG (ref 275–295)
OXYCODONE UR QL SCN: NEGATIVE
PCP UR QL SCN: NEGATIVE
PH UR STRIP.AUTO: 6 [PH] (ref 5–8)
PLATELET # BLD AUTO: 108 10(3)UL (ref 150–450)
PLATELETS.RETICULATED NFR BLD AUTO: 1.2 % (ref 0–7)
POTASSIUM SERPL-SCNC: 4.2 MMOL/L (ref 3.5–5.1)
RBC # BLD AUTO: 3.4 X10(6)UL
RBC UR QL AUTO: NEGATIVE
SODIUM SERPL-SCNC: 118 MMOL/L
SODIUM SERPL-SCNC: 142 MMOL/L (ref 136–145)
SP GR UR STRIP.AUTO: 1.01 (ref 1–1.03)
UROBILINOGEN UR STRIP.AUTO-MCNC: NORMAL MG/DL
WBC # BLD AUTO: 2.7 X10(3) UL (ref 4–11)

## 2024-04-23 PROCEDURE — 99239 HOSP IP/OBS DSCHRG MGMT >30: CPT | Performed by: INTERNAL MEDICINE

## 2024-04-23 PROCEDURE — 99232 SBSQ HOSP IP/OBS MODERATE 35: CPT | Performed by: INTERNAL MEDICINE

## 2024-04-23 NOTE — PLAN OF CARE
Pt A/O x4. VSS. Afebrile. Pt denied pain throughout day. Medications admin per MAR. Pt ambulated safely in room. Pt cleared for DC by all consults. Fall and safety precautions in place. Call light within reach.

## 2024-04-23 NOTE — PAYOR COMM NOTE
--------------  ADMISSION REVIEW     Payor: [employer]  Subscriber #:  [id number]  Authorization Number: [id number]    Admit date: 4/21/24  Admit time:  9:31 PM       REVIEW DOCUMENTATION:      Patient Seen in: [facility] Emergency Department      History     Chief Complaint   Patient presents with    Syncope     Syncopal episode while out to dinner. No fall or trauma. No seizure activity. PT seemed \"asleep\" and was snoring. Lasted about 2-3 mins     Stated Complaint: Syncopal episode while out to dinner.  No fall or trauma. No seizure activity. *    Subjective:   HPI    The patient is a 79-year-old female brought in by EMS after syncopal episode.  Patient was at dinner celebrating her birthday with family members when her niece who is present states she heard the patient snoring.  She looked at her and the patient had her eyes closed.  Initially she thought the patient had just fallen asleep which was still unusual.  She tried to get the patient's attention called her name but the patient was still unresponsive therefore they laid her down across 3 chairs.  By the time EMS arrived patient was becoming more alert.  Currently the patient has no complaints.  She does not know what happened and she is asking why she here in the hospital.  She tells me she has chronic pain in her right shoulder but has no other that I do see that she appears somewhat fatigued as her eyes are drooping as she is talking.    Review of Systems    Positive for stated complaint: Syncopal episode while out to dinner.  No fall or trauma. No seizure activity. *  Other systems are as noted in HPI.  Constitutional and vital signs reviewed.      All other systems reviewed and negative except as noted above.    Physical Exam     ED Triage Vitals [04/21/24 1606]   /51   Pulse 58   Resp 18   Temp 97 °F (36.1 °C)   Temp src Temporal   SpO2 97 %   O2 Device None (Room air)       Current:/79 (BP Location: Right arm)   Pulse 69    Temp 97.4 °F (36.3 °C) (Oral)   Resp 16   Ht 154.9 cm (5' 1\")   Wt 72.6 kg   SpO2 98%   BMI 30.23 kg/m²       Physical Exam  Vitals and nursing note reviewed.   Constitutional:       General: She is not in acute distress.     Appearance: Normal appearance.   HENT:      Head: Normocephalic.      Nose: Nose normal.      Mouth/Throat:      Mouth: Mucous membranes are moist.   Eyes:      Extraocular Movements: Extraocular movements intact.      Pupils: Pupils are equal, round, and reactive to light.   Cardiovascular:      Rate and Rhythm: Normal rate and regular rhythm.      Pulses: Normal pulses.   Pulmonary:      Effort: Pulmonary effort is normal.   Abdominal:      General: Abdomen is flat. Bowel sounds are normal. There is no distension.      Palpations: Abdomen is soft.      Tenderness: There is no abdominal tenderness. There is no right CVA tenderness, left CVA tenderness, guarding or rebound.      Hernia: No hernia is present.   Musculoskeletal:         General: No swelling or tenderness. Normal range of motion.      Cervical back: Normal range of motion.   Skin:     General: Skin is warm and dry.   Neurological:      Mental Status: She is alert and oriented to person, place, and time. Mental status is at baseline.   Psychiatric:         Mood and Affect: Mood normal.         ED Course     Labs Reviewed   COMP METABOLIC PANEL (14) - Abnormal; Notable for the following components:       Result Value    Glucose 188 (*)     BUN 84 (*)     Creatinine 4.25 (*)     Calcium, Total 7.9 (*)     Calculated Osmolality 322 (*)     eGFR-Cr 10 (*)     AST 6 (*)     ALT 12 (*)     Albumin 3.1 (*)     A/G Ratio 0.9 (*)     All other components within normal limits   CBC W/ DIFFERENTIAL - Abnormal; Notable for the following components:    RBC 3.20 (*)     HGB 7.7 (*)     HCT 26.1 (*)     PLT 96.0 (*)     MCH 24.1 (*)     MCHC 29.5 (*)     Lymphocyte Absolute 0.50 (*)     All other components within normal limits    EKG    Rate, intervals and axes as noted on EKG Report.  Rate: 57  Rhythm: Sinus Rhythm  Reading: No ST elevations depressions noted but the patient has a first-degree AV block and sinus bradycardia      XR CHEST AP PORTABLE  (CPT=71045)    Result Date: 2024  CONCLUSION:  Diffuse interstitial prominence and mild cardiomegaly.  Findings may be related to edema though superimposed infectious/inflammatory process is not excluded.   LOCATION:  [place]      Dictated by (CST): Omega Gonzales MD on 2024 at 5:47 PM     Finalized by (CST): Omega Gonzales MD on 2024 at 5:47 PM        4:27 PM  I was informed by nursing staff that patient apparently took an edible    5:12 PM  Bill a BUN of 80, creatinine of 4.25 which is above patient's baseline has I reviewed care everywhere and it looks like her creatinine is usually between 3-3.4 patient's hemoglobin is also 7.7 and her baseline is closer to s 8.7.     edical Decision Making      Disposition and Plan     Clinical Impression:  1. BARBER (acute kidney injury) (HCC)    2. Syncope and collapse    3. Oliguria         Disposition:  Admit  2024  6:28 pm      [facility]     Daisy Gorman Patient Status:  Emergency    1945 MRN [id number]   Location [facility] EMERGENCY DEPARTMENT Attending Nelly Jeronimo MD   Hosp Day # 0 PCP None Pcp     Chief Complaint: syncope    Subjective:    History of Present Illness:     Daisy Gorman is a 79 year old female with episode of syncope while at dinner. Pt was celebrating her birthday today with family when she had episode. Family thought she had just fallen asleep but when they tried to arouse her she was unresponsive. They laid her down and called EMS. She is awake and alert now in ED. She denies any focal weakness, numbness or tingling. No CP or SOB. She has noticed some worsening leg swelling. She denies any N/V/D/C or abd pain. She states she has been eatign well. Pt has noticed that  she has been urinating less and less.     History/Other:    Past Medical History:  No past medical history on file.  Past Surgical History:   No past surgical history on file.   Family History:   No family history on file.  Social History:         Allergies:   Allergies   Allergen Reactions    Amoxicillin-Pot Clavulanate OTHER (SEE COMMENTS)     .    Clavulanic Acid OTHER (SEE COMMENTS)    Diphtheria Toxoid OTHER (SEE COMMENTS)    Influenza Vaccines OTHER (SEE COMMENTS)     .    Pertussis Vaccines OTHER (SEE COMMENTS)    Tetanus Toxoid OTHER (SEE COMMENTS)     .    Penicillins RASH     .    Assessment & Plan:      #Syncope  Monitor on tele  Check ECHO  Check orthostatics  Cardiology to eval  Check CT head    #Acute on CKD  Renal to eval  Check renal US  Monitor urine output    #Lower Ext Edema  #Pulm Edema  Check LE doppler  Cardiology to eval    #Anemia  No active bleeding  Presumed chronic from renal disease  Check iron studies      Plan of care discussed with patient, ED Physician    Ford Linton MD    Supplementary Documentation:         4/22 Nephrology     coni for Consultation:  CKD/BARBER     History of Present Illness:  Daisy Gorman is a a(n) 79 year old female with hx of CKD- stg IV- follows w/ Dr. Jones (baseline Cr ~ 3-4, seizure d/o, HTN admitted to hospital for evaluation of syncope/episode of unresponsiveness. Patient denies any acute problems currently  Denies headaches/vision changes. No n/v  No palpiations          4/22 Cardiology     Impression:   Syncope - more prerenal on labs, so may have been orthostattic.  Also has a h/o seizures.  Difficult to control HTN.  Acute on CRI, baseline Cr 3-4.  Acute on chronic Anemia - Hb 7.4, baseline 8.5.  CXR suggestive of CHF - historically normal EF.  On RA.     Plan:  Will check echo.  Same BP meds as on admit.  Resume po furosemide.  Consider neuro eval to r/o seizure.     César Hou MD      4/22 Neurology     mpression:  Syncope- unclear  etiology; appears volume replete.   - monitor on tele  - monitor orthostatics  - appreciate cardiology  - consider neuro evaluation  BARBER/CKD- Cr near baseline; she follows closely w/ Dr. Jones  - check UA  - await renal US findings  - monitor labs  Anemia - suspect due to chronic disease  - montior: check iron studies  - will consider VINCENT  HTN- sub-optimal currently  - meds reviewed  Seizure d/o - on tegretol  Hyothyroidism       4/22 Hospitalist    #BARBER on CKD IV  -IVF     #Syncope vs. Seizure  -telemetry  -ECHO pending, Cardiology on consult   -does report a history of seizure disorder, check EEG     #Seizure disorder  -Tegretol TID  -reduce Gabapentin for renal fxn      #Pancytopenia   -monitor counts, possible MDS, should have outpatient      #HTN  -PTA antihypertensives      #Dementia  -Memantine        aDmion Nye, DO      4/23 Cardiology     Impression:  Syncope - may be orthostatic.  Also has a h/o seizures.  Difficult to control HTN.  Acute on CRI, baseline Cr 3-4.  Acute on chronic Anemia - Hb 7.4, baseline 8.5.  CXR suggestive of CHF - historically normal EF.  On RA.            Plan:  Nurse reports she had a gummy before her birthday dinner?  Cannabis on drug screen.  Echo unremarkable.  I do not feel cardiac etiology for event, unless possibly orthostatic.      César Hou MD      4/23 Nephrology     mpression:  Syncope- unclear etiology; appears volume replete.   - monitor on tele  - appreciate cardiology  BARBER/CKD- Cr near baseline; she follows closely w/ Dr. Jones; UA is bland  - await renal US findings  - monitor labs  Anemia - suspect due to chronic disease  Iron saturation OK 31%  - dose w/ VINCENT   HTN- better  - meds reviewed  Seizure d/o - on tegretol  Hyothyroidism           Labs:  HEM:        Recent Labs   Lab 04/21/24  1613 04/22/24  0640 04/23/24  0631   WBC 4.0 3.1* 2.7*   HGB 7.7* 7.4* 8.0*   PLT 96.0* 115.0* 108.0*         Chem:       Recent Labs   Lab 04/21/24  1613 04/22/24  0640     142   K 5.1 4.4    111   CO2 24.0 24.0   BUN 84* 80*   CREATSERUM 4.25* 3.48*   CA 7.9* 8.5   MG  --  2.1   * 91     MEDICATIONS ADMINISTERED IN LAST 1 DAY:  allopurinol (Zyloprim) tab 100 mg       Date Action Dose Route User    4/23/2024 0813 Given 100 mg Oral Dao Newberry RN          atorvastatin (Lipitor) tab 20 mg       Date Action Dose Route User    4/23/2024 0813 Given 20 mg Oral Dao Newberry RN          carBAMazepine (TEGretol) tab 200 mg       Date Action Dose Route User    4/23/2024 0813 Given 200 mg Oral Dao Newberry RN    4/22/2024 2046 Given 200 mg Oral Vane Martinez RN    4/22/2024 1830 Given 200 mg Oral Dao Newberry RN          carvedilol (Coreg) tab 12.5 mg       Date Action Dose Route User    4/23/2024 0812 Given 12.5 mg Oral Dao Newberry RN    4/22/2024 1830 Given 12.5 mg Oral Dao Newberry RN          cloNIDine (Catapres) tab 0.2 mg       Date Action Dose Route User    4/23/2024 0813 Given 0.2 mg Oral Dao Newberry RN    4/22/2024 2028 Given 0.2 mg Oral Vane Martinez RN          DULoxetine (Cymbalta) DR cap 60 mg       Date Action Dose Route User    4/23/2024 0812 Given 60 mg Oral Dao Newberry RN          epoetin deepali (Epogen, Procrit) 55619 UNIT/ML injection 10,000 Units       Date Action Dose Route User    4/23/2024 1322 Given 10,000 Units Subcutaneous (Left Upper Abdomen) Dao Newberry RN          furosemide (Lasix) tab 40 mg       Date Action Dose Route User    4/23/2024 0813 Given 40 mg Oral Dao Newberry RN          gabapentin (Neurontin) cap 100 mg       Date Action Dose Route User    4/23/2024 0813 Given 100 mg Oral Dao Newberry RN    4/22/2024 2045 Given 100 mg Oral Vane Martinez RN    4/22/2024 1830 Given 100 mg Oral Dao Newberry RN          heparin (Porcine) 5000 UNIT/ML injection 5,000 Units       Date Action Dose Route User    4/23/2024 1322 Given 5,000 Units Subcutaneous (Left Lower Abdomen) Dao Newberry,  RN    4/23/2024 0609 Given 5,000 Units Subcutaneous (Left Lower Abdomen) Vane Martinez RN    4/22/2024 2027 Given 5,000 Units Subcutaneous (Right Lower Abdomen) Vane Martinez RN          hydrALAZINE (Apresoline) tab 50 mg       Date Action Dose Route User    4/23/2024 1329 Given 50 mg Oral Dao Newberry RN    4/23/2024 0812 Given 50 mg Oral Dao Newberry RN    4/22/2024 1830 Given 50 mg Oral Dao Newberry RN          levothyroxine (Synthroid) tab 100 mcg       Date Action Dose Route User    4/23/2024 0609 Given 100 mcg Oral Vane Martinez RN          memantine (Namenda) tab 10 mg       Date Action Dose Route User    4/23/2024 0813 Given 10 mg Oral Dao Newberry RN          NIFEdipine ER (Procardia-XL) 24 hr tab 60 mg       Date Action Dose Route User    4/23/2024 0812 Given 60 mg Oral Dao Newberry RN    4/22/2024 2027 Given 60 mg Oral Vane Martinez RN          pantoprazole (Protonix) DR tab 40 mg       Date Action Dose Route User    4/23/2024 0813 Given 40 mg Oral Dao Newberry RN          traZODone (Desyrel) tab 200 mg       Date Action Dose Route User    4/22/2024 2028 Given 200 mg Oral Vane Martinez RN            Vitals (last day)       Date/Time Temp Pulse Resp BP SpO2 Weight O2 Device O2 Flow Rate (L/min) [facility]    04/23/24 0809 98.7 °F (37.1 °C) 65 18 162/73 96 % -- None (Room air) --     04/23/24 0630 97.8 °F (36.6 °C) 59 18 156/68 100 % -- None (Room air) -- AK    04/23/24 0000 98.7 °F (37.1 °C) 59 18 149/74 94 % -- None (Room air) -- AK    04/22/24 2100 98.3 °F (36.8 °C) 61 16 164/67 99 % -- None (Room air) -- AK    04/22/24 1833 -- 63 -- 155/101 99 % -- -- --     04/22/24 1423 98.2 °F (36.8 °C) 64 18 160/72 100 % -- None (Room air) --     04/22/24 0829 98.1 °F (36.7 °C) -- 18 -- -- -- None (Room air) --     04/22/24 0354 98.1 °F (36.7 °C) 67 20 168/73 97 % -- None (Room air) -- GS

## 2024-04-23 NOTE — CM/SW NOTE
MARY GRACE spoke with Ashley from [facility] and she confirmed that patient is approved to return today.     SW provided RN with number to call report.     SW met with patient to discuss transport and she stated that her son is working today but might be able to transport her after work. SW called patient's son Derrick and he stated that he would be here after work. Patient stated he works until 6pm. RN aware that patient's son will transport.     SW will continue to follow for plan of care changes and remain available for any additional DC needs or concerns.     Shauna Badillo MSW, LSW  Discharge Planner   [phone]

## 2024-04-23 NOTE — PROGRESS NOTES
Pt A&Ox4 on room air, no complaints of pain, tolerating medications well per mar. VSS, call light within reach, frequent checks made, needs met.

## 2024-04-23 NOTE — PROGRESS NOTES
Cardiology Progress Note        Daisy Gorman Patient Status:  Inpatient    1945 MRN [id number]   [facility] 4NW-A Attending Damion Nye DO   Hosp Day # 2 PCP None Pcp     Subjective:  No issues overnight.  On RA.    Medications:   allopurinol  100 mg Oral Daily    atorvastatin  20 mg Oral Daily    carBAMazepine  200 mg Oral TID    carvedilol  12.5 mg Oral BID with meals    cloNIDine  0.2 mg Oral BID    DULoxetine  60 mg Oral Daily    fluticasone propionate  2 spray Nasal Daily    hydrALAZINE  50 mg Oral TID CC    levothyroxine  100 mcg Oral Before breakfast    memantine  10 mg Oral Daily    NIFEdipine ER  60 mg Oral q12h    pantoprazole  40 mg Oral Daily    traZODone  200 mg Oral Nightly    furosemide  40 mg Oral Daily    gabapentin  100 mg Oral TID    heparin  5,000 Units Subcutaneous Q8H MISTI       Continuous Infusions:        Allergies:  Allergies   Allergen Reactions    Amoxicillin-Pot Clavulanate OTHER (SEE COMMENTS)     .    Clavulanic Acid OTHER (SEE COMMENTS)    Diphtheria Toxoid OTHER (SEE COMMENTS)    Influenza Vaccines OTHER (SEE COMMENTS)     .    Pertussis Vaccines OTHER (SEE COMMENTS)    Tetanus Toxoid OTHER (SEE COMMENTS)     .    Penicillins RASH         Intake/Output:    Intake/Output Summary (Last 24 hours) at 2024 0710  Last data filed at 2024 0630  Gross per 24 hour   Intake 360 ml   Output 1225 ml   Net -865 ml           Last 3 Weights   24 2157 160 lb (72.6 kg)   24 1613 160 lb (72.6 kg)            Physical Exam:    Temp:  [97.8 °F (36.6 °C)-98.7 °F (37.1 °C)] 97.8 °F (36.6 °C)  Pulse:  [59-64] 59  Resp:  [16-18] 18  BP: (149-164)/() 156/68  SpO2:  [94 %-100 %] 100 %    General: No apparent distress  HEENT: No focal deficits.  Neck: supple. JVP normal  Cardiac: Regular rhythm, S1, S2 normal,  rub or gallop.  No murmur.  Lungs: CTA  Abdomen: Soft, non-tender.   Extremities: No edema  Neurologic: no focal deficits  Skin: Warm and dry.      Telemetry: sinus    EKG:      Echo:      Cardiac Cath:      Labs:  HEM:  Recent Labs   Lab 04/21/24  1613 04/22/24  0640 04/23/24  0631   WBC 4.0 3.1* 2.7*   HGB 7.7* 7.4* 8.0*   PLT 96.0* 115.0* 108.0*       Chem:  Recent Labs   Lab 04/21/24  1613 04/22/24  0640    142   K 5.1 4.4    111   CO2 24.0 24.0   BUN 84* 80*   CREATSERUM 4.25* 3.48*   CA 7.9* 8.5   MG  --  2.1   * 91       Recent Labs   Lab 04/21/24  1613   ALT 12*   AST 6*   ALB 3.1*       No results for input(s): \"TROP\", \"CK\" in the last 168 hours.    Recent Labs   Lab 04/21/24  1613   PTP 14.5   INR 1.12                 Impression:  Syncope - may be orthostatic.  Also has a h/o seizures.  Difficult to control HTN.  Acute on CRI, baseline Cr 3-4.  Acute on chronic Anemia - Hb 7.4, baseline 8.5.  CXR suggestive of CHF - historically normal EF.  On RA.          Plan:  Nurse reports she had a gummy before her birthday dinner?  Cannabis on drug screen.  Echo unremarkable.  I do not feel cardiac etiology for event, unless possibly orthostatic.        César Hou MD  4/23/2024  7:10 AM  L2

## 2024-04-23 NOTE — PROGRESS NOTES
[facility]  Progress Note    Daisy Gorman Patient Status:  Inpatient    1945 MRN [id number]   [facility] 4NW-A Attending Damion Nye,    Hosp Day # 2 PCP None Pcp     No acute events            Current Medications:    Current Facility-Administered Medications:     albuterol (Ventolin HFA) 108 (90 Base) MCG/ACT inhaler 2 puff, 2 puff, Inhalation, Q4H PRN    allopurinol (Zyloprim) tab 100 mg, 100 mg, Oral, Daily    atorvastatin (Lipitor) tab 20 mg, 20 mg, Oral, Daily    carBAMazepine (TEGretol) tab 200 mg, 200 mg, Oral, TID    carvedilol (Coreg) tab 12.5 mg, 12.5 mg, Oral, BID with meals    cloNIDine (Catapres) tab 0.2 mg, 0.2 mg, Oral, BID    DULoxetine (Cymbalta) DR cap 60 mg, 60 mg, Oral, Daily    fluticasone propionate (Flonase) 50 MCG/ACT nasal suspension 2 spray, 2 spray, Nasal, Daily    hydrALAZINE (Apresoline) tab 50 mg, 50 mg, Oral, TID CC    levothyroxine (Synthroid) tab 100 mcg, 100 mcg, Oral, Before breakfast    memantine (Namenda) tab 10 mg, 10 mg, Oral, Daily    NIFEdipine ER (Procardia-XL) 24 hr tab 60 mg, 60 mg, Oral, q12h    pantoprazole (Protonix) DR tab 40 mg, 40 mg, Oral, Daily    traZODone (Desyrel) tab 200 mg, 200 mg, Oral, Nightly    furosemide (Lasix) tab 40 mg, 40 mg, Oral, Daily    gabapentin (Neurontin) cap 100 mg, 100 mg, Oral, TID    hydrALAzine (Apresoline) 20 mg/mL injection 10 mg, 10 mg, Intravenous, Q6H PRN    heparin (Porcine) 5000 UNIT/ML injection 5,000 Units, 5,000 Units, Subcutaneous, Q8H MISTI    acetaminophen (Tylenol Extra Strength) tab 500 mg, 500 mg, Oral, Q4H PRN    ondansetron (Zofran) 4 MG/2ML injection 4 mg, 4 mg, Intravenous, Q6H PRN    metoclopramide (Reglan) 5 mg/mL injection 5 mg, 5 mg, Intravenous, Q8H PRN  Home Medications:  No current outpatient medications on file.       Review of Systems:  See HPI; A total of 12 systems reviewed and otherwise unremarkable.    Physical Exam:  Vital signs: Blood pressure (!) 162/73, pulse 65,  temperature 98.7 °F (37.1 °C), temperature source Oral, resp. rate 18, height 5' 1\" (1.549 m), weight 160 lb (72.6 kg), SpO2 96%.  General: No acute distress. Alert and oriented x 3.  HEENT: Moist mucous membranes. EOM-I. PERRL  Neck: No lymphadenopathy.  No JVD. No carotid bruits.  Respiratory: Clear to auscultation bilaterally.  No wheezes. No rhonchi.  Cardiovascular: S1, S2.  Regular rate and rhythm.  No murmurs. Equal pulses   Abdomen: Soft, nontender, nondistended.  Positive bowel sounds. No rebound tenderness   Neurologic: No focal neurological deficits.   Musculoskeletal: Full range of motion of all extremities.  No swelling noted.   Integument: No lesions. No erythema.  Psychiatric: Appropriate mood and affect.    Recent Labs     04/21/24  1613 04/22/24  0640 04/23/24  0631   WBC 4.0 3.1* 2.7*   HGB 7.7* 7.4* 8.0*   MCV 81.6 80.8 80.3   PLT 96.0* 115.0* 108.0*   INR 1.12  --   --        Recent Labs     04/21/24  1613 04/22/24  0640 04/23/24  0631    142 142   K 5.1 4.4 4.2    111 111   CO2 24.0 24.0 26.0   BUN 84* 80* 72*   CREATSERUM 4.25* 3.48* 3.08*   CA 7.9* 8.5 8.8   MG  --  2.1 1.9       Recent Labs     04/21/24  1613   ALT 12*   AST 6*   ALB 3.1*         Imaging:  Reviewed    Impression:  Syncope- unclear etiology; appears volume replete.   - monitor on tele  - appreciate cardiology  BARBER/CKD- Cr near baseline; she follows closely w/ Dr. Jones; UA is bland  - await renal US findings  - monitor labs  Anemia - suspect due to chronic disease  Iron saturation OK 31%  - dose w/ VINCENT   HTN- better  - meds reviewed  Seizure d/o - on tegretol  Hyothyroidism     Thank you for allowing me to participate in this patient's care.  Please feel free to call me with any questions or concerns.    Jaden Avelar MD  4/23/2024

## 2024-04-23 NOTE — DISCHARGE SUMMARY
[facility]     Daisy Gorman Patient Status:  Inpatient    1945 MRN [id number]   Location [facility] 4NW-A Attending Damion Nye,    Hosp Day # 2 PCP None Pcp     Date of Admission: 2024  Date of Discharge:   2024    Discharge Disposition: home    Discharge Diagnosis:  Acute kidney injury  Syncope  Cannabis use    History of Present Illness:      Daisy Gorman is a 79 year old female with episode of syncope while at dinner. Pt was celebrating her birthday today with family when she had episode. Family thought she had just fallen asleep but when they tried to arouse her she was unresponsive. They laid her down and called EMS. She is awake and alert now in ED. She denies any focal weakness, numbness or tingling. No CP or SOB. She has noticed some worsening leg swelling. She denies any N/V/D/C or abd pain. She states she has been eatign well. Pt has noticed that she has been urinating less and less    Brief Synopsis: Patient admitted, hydrated, EEG without any seizure activity, cardiology following consultation, nephrology also consulted with improvement of kidney injury, it was identified that patient had taken a cannabis gummy on her birthday on day of admission likely contributing to her presenting symptoms.  He was discharged in stable condition.    Lace+ Score: 36  59-90 High Risk  29-58 Medium Risk  0-28   Low Risk       TCM Follow-Up Recommendation:  LACE 29-58: Moderate Risk of readmission after discharge from the hospital.      Procedures during hospitalization:   N/a    Incidental or significant findings and recommendations (brief descriptions):  N/a    Lab/Test results pending at Discharge:   N/a    Consultants:  Nephrology, Cardiology     Discharge Medication List:     Discharge Medications        CONTINUE taking these medications        Instructions Prescription details   Albuterol Sulfate 108 (90 Base) MCG/ACT Aepb      Inhale 2 puffs into the lungs  every 4 (four) hours as needed.   Refills: 0     allopurinol 100 MG Tabs  Commonly known as: Zyloprim      Take 1 tablet (100 mg total) by mouth daily.   Refills: 0     atorvastatin 20 MG Tabs  Commonly known as: Lipitor      Take 1 tablet (20 mg total) by mouth daily.   Refills: 0     calcitriol 0.25 MCG Caps  Commonly known as: Rocaltrol      Take 1 capsule (0.25 mcg total) by mouth daily.   Refills: 0     carBAMazepine 200 MG Tabs  Commonly known as: TEGretol      Take 1 tablet (200 mg total) by mouth 3 (three) times daily.   Refills: 0     carvedilol 12.5 MG Tabs  Commonly known as: Coreg      Take 1 tablet (12.5 mg total) by mouth 2 (two) times daily with meals.   Refills: 0     cloNIDine 0.2 MG Tabs  Commonly known as: Catapres      Take 1 tablet (0.2 mg total) by mouth 2 (two) times daily.   Refills: 0     DULoxetine 60 MG Cpep  Commonly known as: Cymbalta      Take 1 capsule (60 mg total) by mouth daily.   Refills: 0     ferrous sulfate 325 (65 FE) MG Tbec      Take 1 tablet (325 mg total) by mouth 2 (two) times daily.   Refills: 0     fluticasone propionate 50 MCG/ACT Susp  Commonly known as: Flonase      2 sprays by Nasal route daily.   Refills: 0     furosemide 40 MG Tabs  Commonly known as: Lasix      Take 1 tablet (40 mg total) by mouth daily.   Refills: 0     gabapentin 100 MG Caps  Commonly known as: Neurontin      Take 2 capsules (200 mg total) by mouth 3 (three) times daily.   Refills: 0     hydrALAZINE 50 MG Tabs  Commonly known as: Apresoline      Take 1 tablet (50 mg total) by mouth 3 (three) times daily with meals.   Refills: 0     ipratropium 0.02 % Soln  Commonly known as: Atrovent      Take by nebulization every 6 (six) hours as needed.   Refills: 0     ipratropium-albuterol 0.5-2.5 (3) MG/3ML Soln  Commonly known as: Duoneb      Inhale 3 mL into the lungs.   Refills: 0     levothyroxine 100 MCG Tabs  Commonly known as: Synthroid      Take 1 tablet (100 mcg total) by mouth before breakfast.    Refills: 0     lidocaine 5 % Ptch  Commonly known as: Lidoderm      Place 1 patch onto the skin every 12 (twelve) hours.   Refills: 0     memantine 10 MG Tabs  Commonly known as: Namenda      Take 1 tablet (10 mg total) by mouth daily.   Refills: 0     NIFEdipine ER 60 MG Tb24  Commonly known as: ADALAT CC      Take 1 tablet (60 mg total) by mouth every 12 (twelve) hours.   Refills: 0     pantoprazole 40 MG Tbec  Commonly known as: Protonix      Take 1 tablet (40 mg total) by mouth daily.   Refills: 0     traZODone 100 MG Tabs  Commonly known as: Desyrel      Take 2 tablets (200 mg total) by mouth nightly.   Refills: 0              [place] n[place]    Follow-up appointment:   Laureano Jones [address] [place] [id number] [phone]    Follow up in 1 week(s)  Follow up 1-2 weeks    Appointments for Next 30 Days 2024 - 2024      None            Vital signs:  Temp:  [97.8 °F (36.6 °C)-98.7 °F (37.1 °C)] 98.7 °F (37.1 °C)  Pulse:  [59-65] 65  Resp:  [16-18] 18  BP: (149-164)/() 162/73  SpO2:  [94 %-100 %] 96 %    Physical Exam:    General: No acute distress   Lungs: clear to auscultation  Cardiovascular: S1, S2  Abdomen: Soft      -----------------------------------------------------------------------------------------------  PATIENT DISCHARGE INSTRUCTIONS: See electronic chart    Damion Nye DO    Total time spent on discharge plannin minutes     The  Cures Act makes medical notes like these available to patients in the interest of transparency. Please be advised this is a medical document. Medical documents are intended to carry relevant information, facts as evident, and the clinical opinion of the practitioner. The medical note is intended as peer to peer communication and may appear blunt or direct. It is written in medical language and may contain abbreviations or verbiage that are unfamiliar.

## 2024-04-24 ENCOUNTER — PATIENT OUTREACH (OUTPATIENT)
Dept: CASE MANAGEMENT | Age: 79
End: 2024-04-24

## 2024-04-24 NOTE — PROGRESS NOTES
NURSING DISCHARGE NOTE    Discharged Rehab facility via Wheelchair.  Accompanied by Family member and Support staff  Belongings Taken by patient/family.    Pt discharged to [facility] assisted [facility] at approx. 1800. Attempted to call report to RN at [facility], was told no RN available at time. AVS discussed w/ pt. All belongings sent w/ pt.

## 2024-04-24 NOTE — PROGRESS NOTES
TCM chart review.  No TCM as patient follows with outside [facility] PCP.  Encounter closing.